# Patient Record
Sex: FEMALE | Race: WHITE | URBAN - METROPOLITAN AREA
[De-identification: names, ages, dates, MRNs, and addresses within clinical notes are randomized per-mention and may not be internally consistent; named-entity substitution may affect disease eponyms.]

---

## 2017-12-06 ENCOUNTER — TRANSFERRED RECORDS (OUTPATIENT)
Dept: HEALTH INFORMATION MANAGEMENT | Facility: CLINIC | Age: 67
End: 2017-12-06

## 2017-12-08 ENCOUNTER — TRANSFERRED RECORDS (OUTPATIENT)
Dept: HEALTH INFORMATION MANAGEMENT | Facility: CLINIC | Age: 67
End: 2017-12-08
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
## 2017-12-11 ENCOUNTER — TELEPHONE (OUTPATIENT)
Dept: FAMILY MEDICINE | Facility: CLINIC | Age: 67
End: 2017-12-11

## 2017-12-11 DIAGNOSIS — C50.912 MALIGNANT NEOPLASM OF LEFT FEMALE BREAST, UNSPECIFIED ESTROGEN RECEPTOR STATUS, UNSPECIFIED SITE OF BREAST (H): Primary | ICD-10-CM

## 2017-12-11 NOTE — TELEPHONE ENCOUNTER
MAIKOL Hurley, from Breast Center called regarding patients Left Axilla biopsy on 17.   Biopsy showed Metastatic Carcinoma to Left Axilla Lymph Node consistent with breast origin, need MRI to confirm origin. Asking for order to have MRI performed at [facility]. MRI already scheduled for 17. Patient has appointment with Dr. Atkins on .     Kavitha Oneill RN     Fax # to [facility]: [phone]

## 2017-12-12 NOTE — TELEPHONE ENCOUNTER
Patient has been diagnosed with left breast cancer. Referral has been sent to [facility] radiology. She can call the [employer] office and schedule the MRI at her convenience.     LULÚ REDDY M.D.

## 2017-12-12 NOTE — TELEPHONE ENCOUNTER
Spoke with Mei at the Breast center.   Confirmed MRI scheduled for 12/14 at [facility], office visit with Dr. Atkins on 12/13, PET scan on 12/18 at [facility] imaging and that Dr. Monroe will be one of the Oncologist seeing patient. Patient will also be seeing Dr. Zavala at [facility].     Kavitha Oneill RN

## 2017-12-13 ENCOUNTER — OFFICE VISIT (OUTPATIENT)
Dept: FAMILY MEDICINE | Facility: CLINIC | Age: 67
End: 2017-12-13
Payer: MEDICARE

## 2017-12-13 VITALS
SYSTOLIC BLOOD PRESSURE: 130 MMHG | TEMPERATURE: 97.6 F | DIASTOLIC BLOOD PRESSURE: 66 MMHG | OXYGEN SATURATION: 99 % | HEIGHT: 63 IN | WEIGHT: 201 LBS | HEART RATE: 80 BPM | BODY MASS INDEX: 35.61 KG/M2

## 2017-12-13 DIAGNOSIS — Z23 NEED FOR PROPHYLACTIC VACCINATION AND INOCULATION AGAINST INFLUENZA: ICD-10-CM

## 2017-12-13 DIAGNOSIS — Z23 NEED FOR PROPHYLACTIC VACCINATION AGAINST STREPTOCOCCUS PNEUMONIAE (PNEUMOCOCCUS): ICD-10-CM

## 2017-12-13 DIAGNOSIS — Z23 NEED FOR PROPHYLACTIC VACCINATION WITH TETANUS-DIPHTHERIA (TD): ICD-10-CM

## 2017-12-13 DIAGNOSIS — I82.4Z1 ACUTE DEEP VEIN THROMBOSIS (DVT) OF DISTAL VEIN OF RIGHT LOWER EXTREMITY (H): ICD-10-CM

## 2017-12-13 DIAGNOSIS — C50.912 MALIGNANT NEOPLASM OF LEFT FEMALE BREAST, UNSPECIFIED ESTROGEN RECEPTOR STATUS, UNSPECIFIED SITE OF BREAST (H): Primary | ICD-10-CM

## 2017-12-13 DIAGNOSIS — I26.99 OTHER ACUTE PULMONARY EMBOLISM WITHOUT ACUTE COR PULMONALE (H): ICD-10-CM

## 2017-12-13 DIAGNOSIS — C50.912 INFILTRATING DUCTAL CARCINOMA OF LEFT BREAST (H): ICD-10-CM

## 2017-12-13 PROCEDURE — 90715 TDAP VACCINE 7 YRS/> IM: CPT | Performed by: FAMILY MEDICINE

## 2017-12-13 PROCEDURE — 90670 PCV13 VACCINE IM: CPT | Performed by: FAMILY MEDICINE

## 2017-12-13 PROCEDURE — 90471 IMMUNIZATION ADMIN: CPT | Performed by: FAMILY MEDICINE

## 2017-12-13 PROCEDURE — G0009 ADMIN PNEUMOCOCCAL VACCINE: HCPCS | Mod: 59 | Performed by: FAMILY MEDICINE

## 2017-12-13 PROCEDURE — G0008 ADMIN INFLUENZA VIRUS VAC: HCPCS | Mod: 59 | Performed by: FAMILY MEDICINE

## 2017-12-13 PROCEDURE — 99214 OFFICE O/P EST MOD 30 MIN: CPT | Mod: 25 | Performed by: FAMILY MEDICINE

## 2017-12-13 PROCEDURE — 90662 IIV NO PRSV INCREASED AG IM: CPT | Performed by: FAMILY MEDICINE

## 2017-12-13 NOTE — MR AVS SNAPSHOT
After Visit Summary   12/13/2017    Tiny Espinosa    MRN: [id number]           Patient Information     Date Of Birth          1950        Visit Information        Provider Department      12/13/2017 1:30 PM Gabi Atkins MD [facility]        Today's Diagnoses     Malignant neoplasm of left female breast, unspecified estrogen receptor status, unspecified site of breast (H)    -  1    Infiltrating ductal carcinoma of left breast (H)        Need for prophylactic vaccination and inoculation against influenza        Need for prophylactic vaccination against Streptococcus pneumoniae (pneumococcus)        Need for prophylactic vaccination with tetanus-diphtheria (TD)          Care Instructions    [facility]    If you have any questions regarding to your visit please contact your care team:       Team Purple:   Clinic Hours Telephone Number   Dr. Gabi Garcia   7am-7pm  Monday - Thursday   7am-5pm  Fridays  [phone]  (Appointment scheduling available 24/7)    Questions about your Visit?   Team Line:  [phone]   Urgent Care - [place] and [facility] - 11am-9pm Monday-Friday Saturday-Sunday- 9am-5pm   [place] - 5pm-9pm Monday-Friday Saturday-Sunday- 9am-5pm  [phone] - [place]   [phone] [facility]       What options do I have for visits at the clinic other than the traditional office visit?  To expand how we care for you, many of our providers are utilizing electronic visits (e-visits) and telephone visits, when medically appropriate, for interactions with their patients rather than a visit in the clinic.   We also offer nurse visits for many medical concerns. Just like any other service, we will bill your insurance company for this type of visit based on time spent on the phone with your provider. Not all insurance companies cover these visits. Please check with  your medical insurance if this type of visit is covered. You will be responsible for any charges that are not paid by your insurance.      E-visits via Relay Network[employer]:  generally incur a $35.00 fee.  Telephone visits:  Time spent on the phone: *charged based on time that is spent on the phone in increments of 10 minutes. Estimated cost:   5-10 mins $30.00   11-20 mins. $59.00   21-30 mins. $85.00     Use [email or web address] (secure email communication and access to your chart) to send your primary care provider a message or make an appointment. Ask someone on your Team how to sign up for [email or web address].  For a Price Quote for your services, please call our [place] Line at [phone].  As always, Thank you for trusting us with your health care needs!              Follow-ups after your visit        Additional Services     RADIOLOGY REFERRAL       Your provider has referred you to: [facility]: [facility] [phone]   [email or web address]  MRI of left breast due to invasive ductal carcinoma of left axillary nodes     Please be aware that coverage of these services is subject to the terms and limitations of your health insurance plan.  Call member services at your health plan with any benefit or coverage questions.      Please bring the following to your appointment:    >>   Any x-rays, CTs or MRIs which have been performed.  Contact the facility where they were done to arrange for  prior to your scheduled appointment.    >>   List of current medications   >>   This referral request   >>   Any documents/labs given to you for this referral    Prior authorization is required for MRI/MRA, CT, Dexa Scans and Worker's Compensation cases.                  Future tests that were ordered for you today     Open Future Orders        Priority Expected Expires Ordered    MR Breast Left w/o & w Contrast Routine  12/13/2018 12/13/2017            Who to contact     If you have questions or need follow up information about today's  "clinic visit or your schedule please contact [facility] directly at [phone].  Normal or non-critical lab and imaging results will be communicated to you by MyChart, letter or phone within 4 business days after the clinic has received the results. If you do not hear from us within 7 days, please contact the clinic through CoverPage Publishinghart or phone. If you have a critical or abnormal lab result, we will notify you by phone as soon as possible.  Submit refill requests through [employer] or call your pharmacy and they will forward the refill request to us. Please allow 3 business days for your refill to be completed.          Additional Information About Your Visit        [employer] Information     [employer] gives you secure access to your electronic health record. If you see a primary care provider, you can also send messages to your care team and make appointments. If you have questions, please call your primary care clinic.  If you do not have a primary care provider, please call [phone] and they will assist you.        Care EveryWhere ID     This is your Care EveryWhere ID. This could be used by other organizations to access your [facility] medical records  [id number]        Your Vitals Were     Pulse Temperature Height Pulse Oximetry BMI (Body Mass Index)       80 97.6  F (36.4  C) (Oral) 5' 2.87\" (1.597 m) 99% 35.75 kg/m2        Blood Pressure from Last 3 Encounters:   12/13/17 130/66   04/06/16 138/80   04/30/15 135/80    Weight from Last 3 Encounters:   12/13/17 201 lb (91.2 kg)   04/06/16 212 lb 9.6 oz (96.4 kg)   04/30/15 203 lb (92.1 kg)              We Performed the Following     PNEUMOCOCCAL CONJ VACCINE 13 VALENT IM     RADIOLOGY REFERRAL     TDAP (ADACEL)        Primary Care Provider Office Phone # Fax #    Dayne Garza PA-C [phone] [address] [address]        Equal Access to Services     HUANG TREJO AH: Marilu Berger, " jorge peterson, qaybta karoman morataya, kitty hackettaajeny ah. So [facility] [phone].    ATENCIÓN: Si alvarezla tonya, tiene a pro disposición servicios gratuitos de asistencia lingüística. Deon al [phone].    We comply with applicable federal civil rights laws and [place] laws. We do not discriminate on the basis of race, color, national origin, age, disability, sex, sexual orientation, or gender identity.            Thank you!     Thank you for choosing [facility]  for your care. Our goal is always to provide you with excellent care. Hearing back from our patients is one way we can continue to improve our services. Please take a few minutes to complete the written survey that you may receive in the mail after your visit with us. Thank you!             Your Updated Medication List - Protect others around you: Learn how to safely use, store and throw away your medicines at www.disposemymeds.org.          This list is accurate as of: 12/13/17  2:06 PM.  Always use your most recent med list.                   Brand Name Dispense Instructions for use Diagnosis    CALCIUM CARBONATE PO      Take 1 tablet by mouth daily        MAGNESIA PO      Take 1 tablet by mouth daily        Multi-vitamin Tabs tablet      Take 1 tablet by mouth daily        OMEGA-3 FISH OIL PO      Take 1 tablet by mouth daily        rivaroxaban ANTICOAGULANT 15 MG Tabs tablet    XARELTO     Take 15 mg by mouth 2 times daily        VITAMIN B 12 PO           VITAMIN D PO

## 2017-12-13 NOTE — NURSING NOTE
"Screening Questionnaire for Adult Immunization    Are you sick today?   No   Do you have allergies to medications, food, a vaccine component or latex?   No   Have you ever had a serious reaction after receiving a vaccination?   No   Do you have a long-term health problem with heart disease, lung disease, asthma, kidney disease, metabolic disease (e.g. diabetes), anemia, or other blood disorder?   No   Do you have cancer, leukemia, HIV/AIDS, or any other immune system problem?   Yes-Cancer   In the past 3 months, have you taken medications that affect  your immune system, such as prednisone, other steroids, or anticancer drugs; drugs for the treatment of rheumatoid arthritis, Crohn s disease, or psoriasis; or have you had radiation treatments?   No   Have you had a seizure, or a brain or other nervous system problem?   No   During the past year, have you received a transfusion of blood or blood     products, or been given immune (gamma) globulin or antiviral drug?   No   For women: Are you pregnant or is there a chance you could become        pregnant during the next month?   No   Have you received any vaccinations in the past 4 weeks?   No     Immunization questionnaire was positive for at least one answer.  Notified Dr. Atkins. Verbally \" OK\" per Dr. Atkins to give to patient.        Per orders of Dr. Atkins, injection of Tdap, Pneumococcal 13 and Influenza shot given by An Cooper. Patient instructed to remain in clinic for 15 minutes afterwards, and to report any adverse reaction to me immediately.       Screening performed by An Cooper on 12/13/2017 at 2:24 PM.    "

## 2017-12-13 NOTE — NURSING NOTE
"Chief Complaint   Patient presents with     ER F/U     was in [facility] 12/06/2017     Health Maintenance     FALL RISK, MAMMO       Initial /66  Pulse 80  Temp 97.6  F (36.4  C) (Oral)  Ht 5' 2.87\" (1.597 m)  Wt 201 lb (91.2 kg)  SpO2 99%  BMI 35.75 kg/m2 Estimated body mass index is 35.75 kg/(m^2) as calculated from the following:    Height as of this encounter: 5' 2.87\" (1.597 m).    Weight as of this encounter: 201 lb (91.2 kg).  Medication Reconciliation: complete     An MARCO A Cooper    "

## 2017-12-13 NOTE — PROGRESS NOTES

## 2017-12-13 NOTE — PATIENT INSTRUCTIONS
[facility]    If you have any questions regarding to your visit please contact your care team:       Team Purple:   Clinic Hours Telephone Number   Dr. Gabi Garcia   7am-7pm  Monday - Thursday   7am-5pm  Fridays  [phone]  (Appointment scheduling available 24/7)    Questions about your Visit?   Team Line:  [phone]   Urgent Care - [place] and [facility] - 11am-9pm Monday-Friday Saturday-Sunday- 9am-5pm   [place] - 5pm-9pm Monday-Friday Saturday-Sunday- 9am-5pm  [phone] - [facility]  [phone] - [place]       What options do I have for visits at the clinic other than the traditional office visit?  To expand how we care for you, many of our providers are utilizing electronic visits (e-visits) and telephone visits, when medically appropriate, for interactions with their patients rather than a visit in the clinic.   We also offer nurse visits for many medical concerns. Just like any other service, we will bill your insurance company for this type of visit based on time spent on the phone with your provider. Not all insurance companies cover these visits. Please check with your medical insurance if this type of visit is covered. You will be responsible for any charges that are not paid by your insurance.      E-visits via PayProp:  generally incur a $35.00 fee.  Telephone visits:  Time spent on the phone: *charged based on time that is spent on the phone in increments of 10 minutes. Estimated cost:   5-10 mins $30.00   11-20 mins. $59.00   21-30 mins. $85.00     Use Solexelhart (secure email communication and access to your chart) to send your primary care provider a message or make an appointment. Ask someone on your Team how to sign up for PayProp.  For a Price Quote for your services, please call our Consumer Price Line at [phone].  As always, Thank you for trusting us with your health care needs!

## 2017-12-14 ENCOUNTER — TRANSFERRED RECORDS (OUTPATIENT)
Dept: HEALTH INFORMATION MANAGEMENT | Facility: CLINIC | Age: 67
End: 2017-12-14

## 2017-12-22 ENCOUNTER — TRANSFERRED RECORDS (OUTPATIENT)
Dept: HEALTH INFORMATION MANAGEMENT | Facility: CLINIC | Age: 67
End: 2017-12-22

## 2018-01-17 ENCOUNTER — TRANSFERRED RECORDS (OUTPATIENT)
Dept: HEALTH INFORMATION MANAGEMENT | Facility: CLINIC | Age: 68
End: 2018-01-17

## 2018-02-28 ENCOUNTER — TRANSFERRED RECORDS (OUTPATIENT)
Dept: HEALTH INFORMATION MANAGEMENT | Facility: CLINIC | Age: 68
End: 2018-02-28

## 2018-03-26 ENCOUNTER — TRANSFERRED RECORDS (OUTPATIENT)
Dept: HEALTH INFORMATION MANAGEMENT | Facility: CLINIC | Age: 68
End: 2018-03-26

## 2019-04-10 ENCOUNTER — TELEPHONE (OUTPATIENT)
Dept: FAMILY MEDICINE | Facility: CLINIC | Age: 69
End: 2019-04-10

## 2019-04-10 NOTE — LETTER
April 10, 2019          Tiny Espinosa,  [address]  [facility] [address]        Dear Tiny Espinosa      Monitoring and managing your preventative and chronic health conditions are very important to us. Our records indicate that you have not scheduled for Colonoscopy and Annual physical exam.  If you have received your health care elsewhere, please call the clinic so the information can be documented in your chart.    Please call [phone] or message us through your [employer] account to schedule an appointment or provide information for your chart.     Feel free to contact us if you have any questions or concerns!    I look forward to seeing you and working with you on your health care needs.     Sincerely,       Your [place] Care Team/cc

## 2019-04-10 NOTE — TELEPHONE ENCOUNTER
Panel Management Review      Patient has the following on her problem list: None      Composite cancer screening  Chart review shows that this patient is due/due soon for the following Colonoscopy  Summary:    Patient is due/failing the following:   COLONOSCOPY and PHYSICAL    Action needed:   Patient needs referral/order: Colonoscopy, and office visit    Type of outreach:    Sent letter.    Questions for provider review:    None                                                                                                                                    MARCO A Jacobson       Chart routed to none.

## 2020-02-24 ENCOUNTER — HEALTH MAINTENANCE LETTER (OUTPATIENT)
Age: 70
End: 2020-02-24

## 2020-12-13 ENCOUNTER — HEALTH MAINTENANCE LETTER (OUTPATIENT)
Age: 70
End: 2020-12-13

## 2021-04-17 ENCOUNTER — HEALTH MAINTENANCE LETTER (OUTPATIENT)
Age: 71
End: 2021-04-17

## 2021-09-26 ENCOUNTER — HEALTH MAINTENANCE LETTER (OUTPATIENT)
Age: 71
End: 2021-09-26

## 2022-01-01 ENCOUNTER — HEALTH MAINTENANCE LETTER (OUTPATIENT)
Age: 72
End: 2022-01-01